# Patient Record
Sex: MALE | Race: WHITE | NOT HISPANIC OR LATINO | URBAN - METROPOLITAN AREA
[De-identification: names, ages, dates, MRNs, and addresses within clinical notes are randomized per-mention and may not be internally consistent; named-entity substitution may affect disease eponyms.]

---

## 2021-11-17 ENCOUNTER — CLAIMS CREATED FROM THE CLAIM WINDOW (OUTPATIENT)
Dept: URBAN - METROPOLITAN AREA CLINIC 94 | Facility: CLINIC | Age: 66
End: 2021-11-17
Payer: COMMERCIAL

## 2021-11-17 ENCOUNTER — WEB ENCOUNTER (OUTPATIENT)
Dept: URBAN - METROPOLITAN AREA CLINIC 94 | Facility: CLINIC | Age: 66
End: 2021-11-17

## 2021-11-17 VITALS
WEIGHT: 143 LBS | DIASTOLIC BLOOD PRESSURE: 63 MMHG | HEIGHT: 67 IN | TEMPERATURE: 98.2 F | HEART RATE: 78 BPM | BODY MASS INDEX: 22.44 KG/M2 | SYSTOLIC BLOOD PRESSURE: 109 MMHG

## 2021-11-17 DIAGNOSIS — Z90.49 STATUS POST COLECTOMY: ICD-10-CM

## 2021-11-17 DIAGNOSIS — K57.20 PERFORATION OF SIGMOID COLON DUE TO DIVERTICULITIS: ICD-10-CM

## 2021-11-17 DIAGNOSIS — D50.8 ACQUIRED IRON DEFICIENCY ANEMIA DUE TO DECREASED ABSORPTION: ICD-10-CM

## 2021-11-17 PROCEDURE — 99204 OFFICE O/P NEW MOD 45 MIN: CPT | Performed by: PHYSICIAN ASSISTANT

## 2021-11-17 PROCEDURE — 99204 OFFICE O/P NEW MOD 45 MIN: CPT | Performed by: INTERNAL MEDICINE

## 2021-11-17 PROCEDURE — 99244 OFF/OP CNSLTJ NEW/EST MOD 40: CPT | Performed by: INTERNAL MEDICINE

## 2021-11-17 PROCEDURE — 99244 OFF/OP CNSLTJ NEW/EST MOD 40: CPT | Performed by: PHYSICIAN ASSISTANT

## 2021-11-17 RX ORDER — SERTRALINE HYDROCHLORIDE 50 MG/1
1 TABLET TABLET, FILM COATED ORAL ONCE A DAY
Status: ACTIVE | COMMUNITY

## 2021-11-17 RX ORDER — METOPROLOL TARTRATE 25 MG/1
1 TABLET WITH FOOD TABLET, FILM COATED ORAL TWICE A DAY
Status: ACTIVE | COMMUNITY

## 2021-11-17 RX ORDER — ROSUVASTATIN CALCIUM 20 MG/1
1 TABLET TABLET, FILM COATED ORAL ONCE A DAY
Status: ACTIVE | COMMUNITY

## 2021-11-17 RX ORDER — OMEPRAZOLE 20 MG/1
1 CAPSULE 30 MINUTES BEFORE MORNING MEAL CAPSULE, DELAYED RELEASE ORAL ONCE A DAY
Status: ACTIVE | COMMUNITY

## 2021-11-17 NOTE — HPI-TODAY'S VISIT:
66 y.o.  male pt with history of complicated diverticulitis with perforation/ abscess- s/p sigmoid colectomy Takedown total colovesicular fistula drainage of pelvic abscess with colostomy - DR Crowder April 2021.  Patient coming in today to have colonoscopy prior to colostomy reversal. Pt referred to clinic by DR Haseeb Gilliland and surgeon DR Nica Le with [facility]. A copy of records will be faxed to referring MD for review.   Patient denies any GI sx today. He denies melena or hematochezia in colostomy bag. Pt denies abdominal pain or  unintentional wt loss. Pt denies ever having a colonoscopy. He denies a family hx of colon cancer.   Pt was recently evaluated at [facility] ER, on 11/15 with hx of abdominal pain.  PCP told him that WBC was 30 K and needed to go to ER. Upon ER evaluation - CBC, CMP, Lipase no significant abnormalities. WBC normal.   CT 11/12 Interval partial colectomy with unremarkable appearing left-sided colostomy.Moderate right hydroureteronephrosis, of uncertain etiology. Urologic consultation recommended Interval partial colectomy with unremarkable appearing left-sided colostomy. Moderate right hydroureteronephrosis, of uncertain etiology. Urologic consultation recommended  Pt with hx of anemia unspecified. Hgb in ER 11 with Baseline 14.    4/2021 - Diverticulitis of large intestine with perforation and abscess without bleeding Sigmoid colectomy Takedown total colovesicular fistula drainage of pelvic abscess with colostomy

## 2021-11-18 LAB
FERRITIN, SERUM: 100
FOLATE (FOLIC ACID), SERUM: 2.4
IRON BIND.CAP.(TIBC): 295
IRON SATURATION: 14
IRON: 41
UIBC: 254
VITAMIN B12: 506

## 2021-12-15 ENCOUNTER — TELEPHONE ENCOUNTER (OUTPATIENT)
Dept: URBAN - METROPOLITAN AREA CLINIC 19 | Facility: CLINIC | Age: 66
End: 2021-12-15

## 2021-12-15 ENCOUNTER — TELEPHONE ENCOUNTER (OUTPATIENT)
Dept: URBAN - METROPOLITAN AREA CLINIC 94 | Facility: CLINIC | Age: 66
End: 2021-12-15

## 2022-01-12 PROBLEM — 1086381000119102: Status: ACTIVE | Noted: 2021-11-17

## 2022-01-25 ENCOUNTER — OFFICE VISIT (OUTPATIENT)
Dept: URBAN - METROPOLITAN AREA SURGERY CENTER 17 | Facility: SURGERY CENTER | Age: 67
End: 2022-01-25

## 2022-01-25 ENCOUNTER — CLAIMS CREATED FROM THE CLAIM WINDOW (OUTPATIENT)
Dept: URBAN - METROPOLITAN AREA CLINIC 4 | Facility: CLINIC | Age: 67
End: 2022-01-25
Payer: COMMERCIAL

## 2022-01-25 ENCOUNTER — TELEPHONE ENCOUNTER (OUTPATIENT)
Dept: URBAN - METROPOLITAN AREA CLINIC 92 | Facility: CLINIC | Age: 67
End: 2022-01-25

## 2022-01-25 ENCOUNTER — CLAIMS CREATED FROM THE CLAIM WINDOW (OUTPATIENT)
Dept: URBAN - METROPOLITAN AREA SURGERY CENTER 17 | Facility: SURGERY CENTER | Age: 67
End: 2022-01-25
Payer: COMMERCIAL

## 2022-01-25 DIAGNOSIS — K31.A0 GASTRIC INTESTINAL METAPLASIA, UNSPECIFIED: ICD-10-CM

## 2022-01-25 DIAGNOSIS — K31.7 POLYP OF STOMACH AND DUODENUM: ICD-10-CM

## 2022-01-25 DIAGNOSIS — K21.9 GASTRO-ESOPHAGEAL REFLUX DISEASE WITHOUT ESOPHAGITIS: ICD-10-CM

## 2022-01-25 DIAGNOSIS — K31.89 DUODENAL ERYTHEMA: ICD-10-CM

## 2022-01-25 DIAGNOSIS — K63.89 BACTERIAL OVERGROWTH SYNDROME: ICD-10-CM

## 2022-01-25 DIAGNOSIS — D12.4 ADENOMA OF DESCENDING COLON: ICD-10-CM

## 2022-01-25 DIAGNOSIS — D12.4 BENIGN NEOPLASM OF DESCENDING COLON: ICD-10-CM

## 2022-01-25 DIAGNOSIS — D13.2 BENIGN NEOPLASM OF DUODENUM: ICD-10-CM

## 2022-01-25 DIAGNOSIS — K63.89 PNEUMATOSIS OF INTESTINES: ICD-10-CM

## 2022-01-25 DIAGNOSIS — Z43.3 COLOSTOMY: ICD-10-CM

## 2022-01-25 DIAGNOSIS — Z53.8 FAILED ATTEMPTED SURGICAL PROCEDURE: ICD-10-CM

## 2022-01-25 PROCEDURE — G8907 PT DOC NO EVENTS ON DISCHARG: HCPCS | Performed by: INTERNAL MEDICINE

## 2022-01-25 PROCEDURE — 45331 SIGMOIDOSCOPY AND BIOPSY: CPT | Performed by: INTERNAL MEDICINE

## 2022-01-25 PROCEDURE — 88305 TISSUE EXAM BY PATHOLOGIST: CPT | Performed by: PATHOLOGY

## 2022-01-25 PROCEDURE — 88312 SPECIAL STAINS GROUP 1: CPT | Performed by: PATHOLOGY

## 2022-01-25 PROCEDURE — 44389 COLONOSCOPY WITH BIOPSY: CPT | Performed by: INTERNAL MEDICINE

## 2022-01-25 PROCEDURE — 88342 IMHCHEM/IMCYTCHM 1ST ANTB: CPT | Performed by: PATHOLOGY

## 2022-01-25 RX ORDER — METOPROLOL TARTRATE 25 MG/1
1 TABLET WITH FOOD TABLET, FILM COATED ORAL TWICE A DAY
Status: ACTIVE | COMMUNITY

## 2022-01-25 RX ORDER — OMEPRAZOLE 20 MG/1
1 CAPSULE 30 MINUTES BEFORE MORNING MEAL CAPSULE, DELAYED RELEASE ORAL ONCE A DAY
Status: ACTIVE | COMMUNITY

## 2022-01-25 RX ORDER — SERTRALINE HYDROCHLORIDE 50 MG/1
1 TABLET TABLET, FILM COATED ORAL ONCE A DAY
Status: ACTIVE | COMMUNITY

## 2022-01-25 RX ORDER — ROSUVASTATIN CALCIUM 20 MG/1
1 TABLET TABLET, FILM COATED ORAL ONCE A DAY
Status: ACTIVE | COMMUNITY

## 2022-02-08 ENCOUNTER — CLAIMS CREATED FROM THE CLAIM WINDOW (OUTPATIENT)
Dept: URBAN - METROPOLITAN AREA SURGERY CENTER 17 | Facility: SURGERY CENTER | Age: 67
End: 2022-02-08

## 2022-02-08 ENCOUNTER — CLAIMS CREATED FROM THE CLAIM WINDOW (OUTPATIENT)
Dept: URBAN - METROPOLITAN AREA CLINIC 4 | Facility: CLINIC | Age: 67
End: 2022-02-08
Payer: COMMERCIAL

## 2022-02-08 ENCOUNTER — CLAIMS CREATED FROM THE CLAIM WINDOW (OUTPATIENT)
Dept: URBAN - METROPOLITAN AREA SURGERY CENTER 17 | Facility: SURGERY CENTER | Age: 67
End: 2022-02-08
Payer: COMMERCIAL

## 2022-02-08 DIAGNOSIS — K63.89 GASEOUS DISTENTION OF INTESTINE DETERMINED BY X-RAY: ICD-10-CM

## 2022-02-08 DIAGNOSIS — Z86.010 ADENOMAS PERSONAL HISTORY OF COLONIC POLYPS: ICD-10-CM

## 2022-02-08 DIAGNOSIS — K63.5 BENIGN COLON POLYP: ICD-10-CM

## 2022-02-08 PROCEDURE — 45338 SIGMOIDOSCOPY W/TUMR REMOVE: CPT | Performed by: INTERNAL MEDICINE

## 2022-02-08 PROCEDURE — 88305 TISSUE EXAM BY PATHOLOGIST: CPT | Performed by: PATHOLOGY

## 2022-02-08 PROCEDURE — G8907 PT DOC NO EVENTS ON DISCHARG: HCPCS | Performed by: INTERNAL MEDICINE

## 2022-02-08 RX ORDER — SERTRALINE HYDROCHLORIDE 50 MG/1
1 TABLET TABLET, FILM COATED ORAL ONCE A DAY
Status: ACTIVE | COMMUNITY

## 2022-02-08 RX ORDER — ROSUVASTATIN CALCIUM 20 MG/1
1 TABLET TABLET, FILM COATED ORAL ONCE A DAY
Status: ACTIVE | COMMUNITY

## 2022-02-08 RX ORDER — METOPROLOL TARTRATE 25 MG/1
1 TABLET WITH FOOD TABLET, FILM COATED ORAL TWICE A DAY
Status: ACTIVE | COMMUNITY

## 2022-02-08 RX ORDER — OMEPRAZOLE 20 MG/1
1 CAPSULE 30 MINUTES BEFORE MORNING MEAL CAPSULE, DELAYED RELEASE ORAL ONCE A DAY
Status: ACTIVE | COMMUNITY

## 2022-02-23 ENCOUNTER — OFFICE VISIT (OUTPATIENT)
Dept: URBAN - METROPOLITAN AREA CLINIC 94 | Facility: CLINIC | Age: 67
End: 2022-02-23
Payer: COMMERCIAL

## 2022-02-23 VITALS
HEART RATE: 77 BPM | BODY MASS INDEX: 23.86 KG/M2 | DIASTOLIC BLOOD PRESSURE: 56 MMHG | TEMPERATURE: 97.5 F | HEIGHT: 67 IN | WEIGHT: 152 LBS | SYSTOLIC BLOOD PRESSURE: 110 MMHG

## 2022-02-23 DIAGNOSIS — K29.80 PEPTIC DUODENITIS: ICD-10-CM

## 2022-02-23 DIAGNOSIS — Z90.49 STATUS POST COLECTOMY: ICD-10-CM

## 2022-02-23 DIAGNOSIS — D36.9 TUBULAR ADENOMA: ICD-10-CM

## 2022-02-23 DIAGNOSIS — K21.00 GASTROESOPHAGEAL REFLUX DISEASE WITH ESOPHAGITIS WITHOUT HEMORRHAGE: ICD-10-CM

## 2022-02-23 PROCEDURE — 99213 OFFICE O/P EST LOW 20 MIN: CPT | Performed by: PHYSICIAN ASSISTANT

## 2022-02-23 RX ORDER — SERTRALINE HYDROCHLORIDE 50 MG/1
1 TABLET TABLET, FILM COATED ORAL ONCE A DAY
Status: ACTIVE | COMMUNITY

## 2022-02-23 RX ORDER — PANTOPRAZOLE SODIUM 40 MG/1
1 TABLET TABLET, DELAYED RELEASE ORAL ONCE A DAY
Qty: 90 | Refills: 1 | OUTPATIENT

## 2022-02-23 RX ORDER — FOLIC ACID 20 MG
AS DIRECTED CAPSULE ORAL
Status: ACTIVE | COMMUNITY

## 2022-02-23 RX ORDER — ROSUVASTATIN CALCIUM 20 MG/1
1 TABLET TABLET, FILM COATED ORAL ONCE A DAY
Status: ACTIVE | COMMUNITY

## 2022-02-23 RX ORDER — OMEPRAZOLE 20 MG/1
1 CAPSULE 30 MINUTES BEFORE MORNING MEAL CAPSULE, DELAYED RELEASE ORAL ONCE A DAY
OUTPATIENT

## 2022-02-23 RX ORDER — METOPROLOL TARTRATE 25 MG/1
1 TABLET WITH FOOD TABLET, FILM COATED ORAL TWICE A DAY
Status: ACTIVE | COMMUNITY

## 2022-02-23 RX ORDER — OMEPRAZOLE 20 MG/1
1 CAPSULE 30 MINUTES BEFORE MORNING MEAL CAPSULE, DELAYED RELEASE ORAL ONCE A DAY
Status: ACTIVE | COMMUNITY

## 2022-02-23 NOTE — HPI-TODAY'S VISIT:
67 y.o.  male returns today for post procedure f/u visit.   Colonoscopy 1/2022 with solid stool in rectum interfering with visualization, 2 mm Tubular adenoma descending colon, 2 mm hyperplastic polyp in rectum  EGD 1/2022 med hiatal hernia, gastric polyps, gastric erythema and duodenal polyp Bx reveals reflux esophagitis, chronic inactive gastritis suggestive of tx H. Pylori, Fundic gland polyps and peptic duodenitis with Brunner's gland hyperplasia No dysplasia or malignancy  Patient reports epigastric pain today as well as acid reflux multiple times/week.    Pt with history of complicated diverticulitis with perforation/ abscess- s/p sigmoid colectomy Takedown total colovesicular fistula drainage of pelvic abscess with colostomy - DR Crowder April 2021.  Patient's surgeon, DR Nica Le with [facility] requested colonoscopy before reversal  CT 11/12 Interval partial colectomy with unremarkable appearing left-sided colostomy.Moderate right hydroureteronephrosis, of uncertain etiology.   Pt with hx of anemia unspecified. Hgb in ER 11 with Baseline 14.

## 2022-02-24 PROBLEM — 427816007: Status: ACTIVE | Noted: 2021-11-17

## 2022-02-24 PROBLEM — 266433003: Status: ACTIVE | Noted: 2022-02-24

## 2022-02-24 PROBLEM — 72007001: Status: ACTIVE | Noted: 2022-02-23

## 2022-02-24 LAB — H. PYLORI, IGG ABS: 0.53

## 2022-09-19 ENCOUNTER — ERX REFILL RESPONSE (OUTPATIENT)
Dept: URBAN - METROPOLITAN AREA CLINIC 94 | Facility: CLINIC | Age: 67
End: 2022-09-19

## 2022-09-19 RX ORDER — PANTOPRAZOLE SODIUM 40 MG/1
TAKE 1 TABLET ONE TIME DAILY TABLET, DELAYED RELEASE ORAL
Qty: 90 TABLET | Refills: 0 | OUTPATIENT

## 2022-09-19 RX ORDER — PANTOPRAZOLE SODIUM 40 MG/1
1 TABLET TABLET, DELAYED RELEASE ORAL ONCE A DAY
Qty: 90 | Refills: 1 | OUTPATIENT

## 2022-10-29 NOTE — PHYSICAL EXAM SKIN:
no rashes , no suspicious lesions , no areas of discoloration , no jaundice present , good turgor , no masses , no tenderness on palpation - - -

## 2023-02-11 ENCOUNTER — ERX REFILL RESPONSE (OUTPATIENT)
Dept: URBAN - METROPOLITAN AREA CLINIC 94 | Facility: CLINIC | Age: 68
End: 2023-02-11

## 2023-02-11 RX ORDER — PANTOPRAZOLE SODIUM 40 MG/1
TAKE 1 TABLET ONE TIME DAILY TABLET, DELAYED RELEASE ORAL
Qty: 90 TABLET | Refills: 0 | OUTPATIENT

## 2023-02-11 RX ORDER — PANTOPRAZOLE SODIUM 40 MG/1
1 TABLET TABLET, DELAYED RELEASE ORAL ONCE A DAY
Qty: 30 | Refills: 0 | OUTPATIENT

## 2023-07-07 ENCOUNTER — OFFICE VISIT (OUTPATIENT)
Dept: URBAN - METROPOLITAN AREA CLINIC 52 | Facility: CLINIC | Age: 68
End: 2023-07-07

## 2023-07-21 ENCOUNTER — OFFICE VISIT (OUTPATIENT)
Dept: URBAN - METROPOLITAN AREA CLINIC 52 | Facility: CLINIC | Age: 68
End: 2023-07-21

## 2023-09-14 ENCOUNTER — OFFICE VISIT (OUTPATIENT)
Dept: URBAN - METROPOLITAN AREA CLINIC 94 | Facility: CLINIC | Age: 68
End: 2023-09-14

## 2023-09-18 ENCOUNTER — DASHBOARD ENCOUNTERS (OUTPATIENT)
Age: 68
End: 2023-09-18

## 2023-09-18 ENCOUNTER — OFFICE VISIT (OUTPATIENT)
Dept: URBAN - METROPOLITAN AREA CLINIC 52 | Facility: CLINIC | Age: 68
End: 2023-09-18
Payer: COMMERCIAL

## 2023-09-18 VITALS
BODY MASS INDEX: 23.7 KG/M2 | SYSTOLIC BLOOD PRESSURE: 115 MMHG | HEART RATE: 97 BPM | WEIGHT: 151 LBS | TEMPERATURE: 97.7 F | HEIGHT: 67 IN | OXYGEN SATURATION: 97 % | DIASTOLIC BLOOD PRESSURE: 71 MMHG

## 2023-09-18 DIAGNOSIS — K52.89 OTHER SPECIFIED NONINFECTIVE GASTROENTERITIS AND COLITIS: ICD-10-CM

## 2023-09-18 DIAGNOSIS — D50.9 IRON DEFICIENCY ANEMIA, UNSPECIFIED IRON DEFICIENCY ANEMIA TYPE: ICD-10-CM

## 2023-09-18 PROBLEM — 87522002: Status: ACTIVE | Noted: 2023-09-18

## 2023-09-18 PROCEDURE — 99214 OFFICE O/P EST MOD 30 MIN: CPT | Performed by: PHYSICIAN ASSISTANT

## 2023-09-18 RX ORDER — PANTOPRAZOLE SODIUM 40 MG/1
1 TABLET TABLET, DELAYED RELEASE ORAL ONCE A DAY
Qty: 30 | Refills: 0 | Status: ACTIVE | COMMUNITY

## 2023-09-18 RX ORDER — DUTASTERIDE 0.5 MG/1
1 CAPSULE CAPSULE, LIQUID FILLED ORAL ONCE A DAY
Status: ACTIVE | COMMUNITY

## 2023-09-18 RX ORDER — ASPIRIN 81 MG/1
1 TABLET TABLET, COATED ORAL ONCE A DAY
Status: ACTIVE | COMMUNITY

## 2023-09-18 RX ORDER — ROSUVASTATIN CALCIUM 20 MG/1
1 TABLET TABLET, FILM COATED ORAL ONCE A DAY
Status: ACTIVE | COMMUNITY

## 2023-09-18 RX ORDER — CETIRIZINE HYDROCHLORIDE 10 MG/1
1 TABLET TABLET, FILM COATED ORAL ONCE A DAY
Status: ACTIVE | COMMUNITY

## 2023-09-18 RX ORDER — CHOLESTYRAMINE 4 G/9G
1 PACKET MIXED WITH WATER OR NON-CARBONATED DRINK POWDER, FOR SUSPENSION ORAL ONCE A DAY
Qty: 30 | Refills: 1 | OUTPATIENT
Start: 2023-09-18

## 2023-09-18 RX ORDER — CILOSTAZOL 100 MG/1
1 TABLET 30 MINUTES BEFORE OR 2 HOURS AFTER BREAKFAST AND DINNER TABLET ORAL TWICE A DAY
Status: ACTIVE | COMMUNITY

## 2023-09-18 RX ORDER — FOLIC ACID 20 MG
AS DIRECTED CAPSULE ORAL
Status: ACTIVE | COMMUNITY

## 2023-09-18 RX ORDER — TAMSULOSIN HYDROCHLORIDE 0.4 MG/1
1 CAPSULE CAPSULE ORAL ONCE A DAY
Status: ACTIVE | COMMUNITY

## 2023-09-18 NOTE — HPI-TODAY'S VISIT:
68 y.o. male with h/o anemia and complicated diverticulitis with perforation/abscess (s/p sigmoid colectomy, takedown total colovesicular fistula drainage of pelvic abscess with colostomy in 2021; and reversal in 2022). He presents to office c/o chronic diarrhea for 1 year since colectomy. He reports 5-8 episodes of diarrhea 2-4 x a week. Diarrhea is mostly in the morning. Denies black or bloody stools. Imodium helps.   Colonoscopy 1/2022 with solid stool in rectum interfering with visualization, 2 mm Tubular adenoma descending colon, 2 mm hyperplastic polyp in rectum  EGD 1/2022 med hiatal hernia, gastric polyps, gastric erythema and duodenal polyp Bx reveals reflux esophagitis, chronic inactive gastritis suggestive of tx H. Pylori, Fundic gland polyps and peptic duodenitis with Brunner's gland hyperplasia No dysplasia or malignancy.  Flex Sig 2/8/22 (Dr. Bledsoe) with few dimunitive SC polyps (hyperplastic), IH, and evidence of prior surgery with rectosigmoid stump in proximal SC.  Labs 4/24/23: Hgb 9.8.  CMP WNL

## 2023-09-20 ENCOUNTER — LAB OUTSIDE AN ENCOUNTER (OUTPATIENT)
Dept: URBAN - METROPOLITAN AREA CLINIC 94 | Facility: CLINIC | Age: 68
End: 2023-09-20

## 2023-09-22 LAB
ADENOVIRUS F 40/41: NOT DETECTED
CALPROTECTIN, STOOL - QDX: (no result)
CAMPYLOBACTER: NOT DETECTED
CLOSTRIDIUM DIFFICILE: NOT DETECTED
ENTAMOEBA HISTOLYTICA: NOT DETECTED
ENTEROAGGREGATIVE E.COLI: NOT DETECTED
ENTEROTOXIGENIC E.COLI: NOT DETECTED
ESCHERICHIA COLI O157: NOT DETECTED
GIARDIA LAMBLIA: NOT DETECTED
NOROVIRUS GI/GII: NOT DETECTED
PANCREATICELASTASE ELISA, STOOL: (no result)
ROTAVIRUS A: NOT DETECTED
SALMONELLA SPP.: NOT DETECTED
SHIGA-LIKE TOXIN PRODUCING E.COLI: NOT DETECTED
SHIGELLA SPP. / ENTEROINVASIVE E.COLI: NOT DETECTED
VIBRIO PARAHAEMOLYTICUS: NOT DETECTED
VIBRIO SPP.: NOT DETECTED
YERSINIA ENTEROCOLITICA: NOT DETECTED

## 2023-10-04 ENCOUNTER — TELEPHONE ENCOUNTER (OUTPATIENT)
Dept: URBAN - METROPOLITAN AREA CLINIC 52 | Facility: CLINIC | Age: 68
End: 2023-10-04

## 2024-11-13 ENCOUNTER — OFFICE VISIT (OUTPATIENT)
Dept: URBAN - METROPOLITAN AREA CLINIC 109 | Facility: CLINIC | Age: 69
End: 2024-11-13
Payer: COMMERCIAL

## 2024-11-13 VITALS
DIASTOLIC BLOOD PRESSURE: 56 MMHG | BODY MASS INDEX: 22.82 KG/M2 | WEIGHT: 145.4 LBS | HEART RATE: 96 BPM | TEMPERATURE: 98.1 F | SYSTOLIC BLOOD PRESSURE: 86 MMHG | HEIGHT: 67 IN

## 2024-11-13 DIAGNOSIS — Z90.49 HISTORY OF PARTIAL COLECTOMY: ICD-10-CM

## 2024-11-13 DIAGNOSIS — K52.9 CHRONIC DIARRHEA: ICD-10-CM

## 2024-11-13 DIAGNOSIS — R10.30 LOWER ABDOMINAL PAIN: ICD-10-CM

## 2024-11-13 PROBLEM — 428305005: Status: ACTIVE | Noted: 2024-11-13

## 2024-11-13 PROBLEM — 54586004: Status: ACTIVE | Noted: 2024-11-13

## 2024-11-13 PROCEDURE — 99214 OFFICE O/P EST MOD 30 MIN: CPT | Performed by: INTERNAL MEDICINE

## 2024-11-13 RX ORDER — ROSUVASTATIN 20 MG/1
1 TABLET TABLET, FILM COATED ORAL ONCE A DAY
Status: ACTIVE | COMMUNITY

## 2024-11-13 RX ORDER — CILOSTAZOL 100 MG/1
1 TABLET 30 MINUTES BEFORE OR 2 HOURS AFTER BREAKFAST AND DINNER TABLET ORAL TWICE A DAY
Status: ACTIVE | COMMUNITY

## 2024-11-13 RX ORDER — CETIRIZINE HYDROCHLORIDE 10 MG/1
1 TABLET TABLET, FILM COATED ORAL ONCE A DAY
Status: ACTIVE | COMMUNITY

## 2024-11-13 RX ORDER — FOLIC ACID 20 MG
AS DIRECTED CAPSULE ORAL
Status: ACTIVE | COMMUNITY

## 2024-11-13 RX ORDER — DICYCLOMINE HYDROCHLORIDE 10 MG/1
1 CAPSULE 30 MINUTES BEFORE EATING CAPSULE ORAL
Qty: 30 | Refills: 3 | OUTPATIENT
Start: 2024-11-13 | End: 2025-03-13

## 2024-11-13 RX ORDER — DUTASTERIDE 0.5 MG/1
1 CAPSULE CAPSULE, LIQUID FILLED ORAL ONCE A DAY
Status: ACTIVE | COMMUNITY

## 2024-11-13 RX ORDER — ASPIRIN 81 MG/1
1 TABLET TABLET, COATED ORAL ONCE A DAY
Status: ACTIVE | COMMUNITY

## 2024-11-13 RX ORDER — CHOLESTYRAMINE 4 G/9G
1 PACKET MIXED WITH WATER OR NON-CARBONATED DRINK POWDER, FOR SUSPENSION ORAL ONCE A DAY
Qty: 30 | Refills: 1 | Status: ACTIVE | COMMUNITY
Start: 2023-09-18

## 2024-11-13 RX ORDER — TAMSULOSIN HYDROCHLORIDE 0.4 MG/1
1 CAPSULE CAPSULE ORAL ONCE A DAY
Status: ACTIVE | COMMUNITY

## 2024-11-13 RX ORDER — PANTOPRAZOLE SODIUM 40 MG/1
1 TABLET TABLET, DELAYED RELEASE ORAL ONCE A DAY
Qty: 30 | Refills: 0 | Status: ACTIVE | COMMUNITY

## 2024-11-13 NOTE — HPI-TODAY'S VISIT:
The patient presents for f/u appt.  h/o complicated diverticulitis with perforation/abscess (s/p sigmoid colectomy, takedown total colovesicular fistula drainage of pelvic abscess with colostomy in 2021; and reversal in 2022).  has had daily diarrhea since bowel surgeries.  6-10 bm's per day, small volume, has tried questran without improvement.  has lower abd discomfort/cramping at night which improves following bm's.  had flex sig and colonoscopy in 2022 with hyperplastic polyps removed.  on eliquis and plavix for cad and carotid artery stenosis?.  poor appetite.  weight relatively stable from last year (5 lb weight loss).

## 2024-12-23 NOTE — PHYSICAL EXAM SKIN:
no rashes , no suspicious lesions , no areas of discoloration , no jaundice present , good turgor , no masses , no tenderness on palpation May need to resume PPI pending Lactose Tolerance test and subsequent management / Response

## 2025-02-12 ENCOUNTER — OFFICE VISIT (OUTPATIENT)
Dept: URBAN - METROPOLITAN AREA CLINIC 109 | Facility: CLINIC | Age: 70
End: 2025-02-12